# Patient Record
Sex: FEMALE | Race: WHITE | NOT HISPANIC OR LATINO | Employment: UNEMPLOYED | URBAN - METROPOLITAN AREA
[De-identification: names, ages, dates, MRNs, and addresses within clinical notes are randomized per-mention and may not be internally consistent; named-entity substitution may affect disease eponyms.]

---

## 2017-04-25 ENCOUNTER — ALLSCRIPTS OFFICE VISIT (OUTPATIENT)
Dept: OTHER | Facility: OTHER | Age: 47
End: 2017-04-25

## 2017-07-10 ENCOUNTER — DOCTOR'S OFFICE (OUTPATIENT)
Dept: URBAN - NONMETROPOLITAN AREA CLINIC 1 | Facility: CLINIC | Age: 47
Setting detail: OPHTHALMOLOGY
End: 2017-07-10
Payer: COMMERCIAL

## 2017-07-10 ENCOUNTER — RX ONLY (RX ONLY)
Age: 47
End: 2017-07-10

## 2017-07-10 DIAGNOSIS — H40.033: ICD-10-CM

## 2017-07-10 DIAGNOSIS — H05.012: ICD-10-CM

## 2017-07-10 PROCEDURE — 99203 OFFICE O/P NEW LOW 30 MIN: CPT | Performed by: OPHTHALMOLOGY

## 2017-07-10 ASSESSMENT — CONFRONTATIONAL VISUAL FIELD TEST (CVF)
OS_FINDINGS: FULL
OD_FINDINGS: FULL

## 2017-07-11 ASSESSMENT — REFRACTION_MANIFEST
OD_VA1: 20/
OD_VA3: 20/
OS_VA2: 20/
OD_VA2: 20/
OS_VA3: 20/
OD_VA3: 20/
OD_VA3: 20/
OU_VA: 20/
OD_VA1: 20/
OD_VA2: 20/
OS_VA1: 20/
OS_VA2: 20/
OU_VA: 20/
OU_VA: 20/
OS_VA3: 20/
OS_VA1: 20/
OS_VA1: 20/
OD_VA2: 20/
OS_VA2: 20/
OS_VA3: 20/
OD_VA1: 20/

## 2017-07-11 ASSESSMENT — VISUAL ACUITY
OS_BCVA: 20/20-2
OD_BCVA: 20/30-1

## 2017-07-11 ASSESSMENT — REFRACTION_CURRENTRX
OS_OVR_VA: 20/
OD_OVR_VA: 20/

## 2018-01-16 NOTE — MISCELLANEOUS
Provider Comments  Provider Comments:   Pt was a no-show for today's 1pm apt  Pt wcb to r/s at a later date        Signatures   Electronically signed by : Sneha Carlson, ; Apr 25 2017  3:48PM EST                       (Administrative)

## 2018-10-18 ENCOUNTER — NURSE TRIAGE (OUTPATIENT)
Dept: PHYSICAL THERAPY | Facility: OTHER | Age: 48
End: 2018-10-18

## 2018-10-18 NOTE — TELEPHONE ENCOUNTER
Background - Initial Assessment  Clinical complaint: right sided back pain, specifically in the lat area  Does have some issue with neck  Is on her computer a lot  Sometimes it radiates to front of chest  Under bra line  Date of onset: August 2018  Mechanism of injury: unk    Previous Treatment - Previous Treatment  Previous evaluation: chiropractor 1 weekly   Current provider: PCP  Diagnostics: none  Previous treatment: none    Protocols used: [facility] PROTOCOL    Pt transferred from ortho phone room for untreated thoracic pain  Pt started with LBP in March and started with chiropractic care which made her symptoms more manageable  States she hadn't been to the gym in a while and she thinks her body wasn't used to the chiropractics care  Switched chiropractic doctors in the end of July thinking a fresh set of eyes would be helpful as it was taking long to resolve  States her low back pain was starting to resolve and then the thoracic pain started end of July / beginning of August with the start of the new chiropractor  Reports she has had LBP on and off for 20 yrs as a result of falling off a step then  This pain came out of the blue and is totally unrelated to anything she has felt before  Reports she is an avid gym person 2-3 times per week for about 40 min  Has zero pain during the day but when she sits or lays down she has immediate pressure  States she to lie on her right side or sleep into the recliner  Uses heat and ice off and on  States the heat provided more relief than ice  Its when her back is up on something, like the bed or chair is when it's most painful  Pt reports she has some blood disorders and chronic fatigue  RN explained that Comprehensive Spine program is physical therapy based with the goal of getting the patient scheduled in 24 - 48 hrs    Further explained that we schedule patients for a consultation with one of our advanced spine physical therapists for evaluation which may include treatment  These therapists have their doctorate in PTx  If needed, a telemed visit could occur at the same time of this consultation if muscle relaxers or a higher dose NSAID is needed  The goal is to get patients treated as quickly as possible so they can return to their normal activities  Research has shown great results when starting physical therapy sooner than later which helps reduce imaging and costs to patients  Pt thought the program sounded great but admitted to doing PTx with a friend on the side stating PTx is very expensive and she has to pay $100/visit with her insurance  RN offered [facility] financial aid and further explained that based on this intake, her risk category was low and would likely only need a couple sessions        Pt stated "I think she is beyond physical therapy and would like to see a DO and get xrays "    Pt is declining PTx at this time and was transferred back to the Ortho phone room for appt with Ortho sports

## 2020-02-22 ENCOUNTER — HOSPITAL ENCOUNTER (EMERGENCY)
Facility: HOSPITAL | Age: 50
Discharge: HOME | End: 2020-02-22
Attending: EMERGENCY MEDICINE | Admitting: EMERGENCY MEDICINE
Payer: COMMERCIAL

## 2020-02-22 VITALS
TEMPERATURE: 98.5 F | DIASTOLIC BLOOD PRESSURE: 81 MMHG | OXYGEN SATURATION: 96 % | WEIGHT: 206.57 LBS | RESPIRATION RATE: 18 BRPM | SYSTOLIC BLOOD PRESSURE: 164 MMHG | HEART RATE: 90 BPM | BODY MASS INDEX: 34.38 KG/M2

## 2020-02-22 DIAGNOSIS — I10 HIGH BLOOD PRESSURE: Primary | ICD-10-CM

## 2020-02-22 PROCEDURE — 93005 ELECTROCARDIOGRAM TRACING: CPT

## 2020-02-22 PROCEDURE — 99284 EMERGENCY DEPT VISIT MOD MDM: CPT | Performed by: EMERGENCY MEDICINE

## 2020-02-22 PROCEDURE — 99283 EMERGENCY DEPT VISIT LOW MDM: CPT

## 2020-02-22 RX ORDER — UREA 10 %
20 LOTION (ML) TOPICAL DAILY
COMMUNITY

## 2020-02-22 RX ORDER — OXYCODONE HYDROCHLORIDE 5 MG/1
5 CAPSULE ORAL EVERY 4 HOURS PRN
COMMUNITY

## 2020-02-22 RX ORDER — PANTOPRAZOLE SODIUM 40 MG/1
40 TABLET, DELAYED RELEASE ORAL DAILY
COMMUNITY

## 2020-02-22 RX ORDER — OXYCODONE HCL 10 MG/1
10 TABLET, FILM COATED, EXTENDED RELEASE ORAL 2 TIMES DAILY
COMMUNITY
Start: 2020-02-20 | End: 2020-03-01

## 2020-02-22 RX ORDER — METOPROLOL SUCCINATE 100 MG/1
100 TABLET, EXTENDED RELEASE ORAL DAILY
COMMUNITY
Start: 2020-01-03

## 2020-02-22 RX ORDER — DEXAMETHASONE 4 MG/1
4 TABLET ORAL DAILY
COMMUNITY
Start: 2019-12-26

## 2020-02-22 RX ORDER — CLONIDINE HYDROCHLORIDE 0.1 MG/1
0.2 TABLET ORAL ONCE
Status: COMPLETED | OUTPATIENT
Start: 2020-02-22 | End: 2020-02-22

## 2020-02-22 RX ADMIN — CLONIDINE HYDROCHLORIDE 0.2 MG: 0.1 TABLET ORAL at 06:16

## 2020-02-22 NOTE — DISCHARGE INSTRUCTIONS
Make sure to follow up with your family doctor tomorrow  If symptoms persist or worsen, return to ER!

## 2020-02-22 NOTE — ED PROVIDER NOTES
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  Took blood pressure 1 hour ago and it was 180/100  Pt normally 115/70 and was concerned that BP abnormally elevated  Pt states she has a headache, denies chest pain  58-year-old female with extensive past medical history presents with high blood pressure  Hypertension   Severity:  Moderate  Onset quality:  Gradual  Duration:  1 day  Timing:  Constant  Progression:  Unchanged  Chronicity:  Recurrent  Context: stress    Relieved by:  Nothing  Worsened by:  Nothing  Ineffective treatments:  None tried  Associated symptoms: headaches    Associated symptoms: no chest pain        Prior to Admission Medications   Prescriptions Last Dose Informant Patient Reported? Taking?    Cetirizine HCl 10 MG CAPS 2/21/2020 at Unknown time  Yes Yes   Sig: Take 10 mg by mouth daily   Cholecalciferol 25 MCG (1000 UT) tablet Past Week at Unknown time  Yes Yes   Sig: Take 5,000 Units by mouth daily   Clopidogrel Bisulfate (PLAVIX PO) 2/22/2020 at Unknown time  Yes Yes   Sig: Take 75 mg by mouth daily   GABAPENTIN PO 2/21/2020 at Unknown time  Yes Yes   Sig: Take 600 mg by mouth 3 (three) times a day   Ivosidenib (Tibsovo) 250 MG TABS 2/17/2020 at Unknown time  Yes Yes   Sig: Take 500 mg by mouth daily   dexamethasone (DECADRON) 4 mg tablet 2/21/2020 at Unknown time  Yes Yes   Sig: Take 4 mg by mouth daily   melatonin 1 mg 2/21/2020 at Unknown time  Yes Yes   Sig: Take 20 mg by mouth daily   metoprolol succinate (TOPROL-XL) 100 mg 24 hr tablet 2/21/2020 at Unknown time  Yes Yes   Sig: Take 100 mg by mouth daily   oxyCODONE (OXY-IR) 5 MG capsule 2/17/2020 at Unknown time  Yes Yes   Sig: Take 5 mg by mouth every 4 (four) hours as needed   oxyCODONE (OxyCONTIN) 10 mg 12 hr tablet 2/21/2020 at Unknown time  Yes Yes   Sig: Take 10 mg by mouth 2 (two) times a day   pantoprazole (PROTONIX) 40 mg tablet 2/21/2020 at Unknown time  Yes Yes   Sig: Take 40 mg by mouth daily      Facility-Administered Medications: None       Past Medical History:   Diagnosis Date    Cancer [facility])     bile duct cancer    History of left hip replacement     Dec 2019    Hypertension     MI [facility]ocardial [facility]) [facility] [address] )     2013       Past Surgical History:   Procedure Laterality Date    CHOLECYSTECTOMY      OTHER SURGICAL HISTORY      radiation to L hip       History reviewed  No pertinent family history  I have reviewed and agree with the history as documented  Social History     Tobacco Use    Smoking status: Never Smoker    Smokeless tobacco: Never Used   Substance Use Topics    Alcohol use: Never     Frequency: Never    Drug use: Never       Review of Systems   Cardiovascular: Negative for chest pain  Neurological: Positive for headaches  All other systems reviewed and are negative  Physical Exam  Physical Exam   Constitutional: She is oriented to person, place, and time  She appears well-developed and well-nourished  HENT:   Head: Normocephalic  Eyes: Pupils are equal, round, and reactive to light  EOM are normal    Neck: Normal range of motion  Neck supple  Cardiovascular: Normal rate and regular rhythm  Pulmonary/Chest: Effort normal and breath sounds normal    Abdominal: Soft  Bowel sounds are normal  She exhibits no distension  Musculoskeletal: Normal range of motion  Neurological: She is alert and oriented to person, place, and time  Skin: Skin is warm and dry  Capillary refill takes less than 2 seconds  Psychiatric: She has a normal mood and affect  Her behavior is normal    Nursing note and vitals reviewed        Vital Signs  ED Triage Vitals   Temperature Pulse Respirations Blood Pressure SpO2   02/22/20 0603 02/22/20 0603 02/22/20 0603 02/22/20 0603 02/22/20 0603   98 5 °F (36 9 °C) 101 17 (!) 191/94 93 %      Temp Source Heart Rate Source Patient Position - Orthostatic VS BP Location FiO2 (%)   02/22/20 0603 02/22/20 0603 02/22/20 0603 02/22/20 0603 --   Temporal Monitor Sitting Left arm       Pain Score       02/22/20 0620       4           Vitals:    02/22/20 0603 02/22/20 0637   BP: (!) 191/94 164/81   Pulse: 101 90   Patient Position - Orthostatic VS: Sitting Sitting         Visual Acuity      ED Medications  Medications   cloNIDine (CATAPRES) tablet 0 2 mg (0 2 mg Oral Given 2/22/20 0393)       Diagnostic Studies  Results Reviewed     None                 No orders to display              Procedures  Procedures         ED Course  ED Course as of Feb 23 0627   Sat Feb 22, 2020   0646 Patient's pressure has improved, symptoms have resolved  Patient was counseled to follow-up with family doctor regarding blood pressure and pain management  Patient is counseled signs and symptoms return  8191 EKG:  Normal sinus rhythm and 80 beats per minute, normal SD, normal QRS, normal QTC, no acute ST elevations noted  MDM      Disposition  Final diagnoses:   High blood pressure     Time reflects when diagnosis was documented in both MDM as applicable and the Disposition within this note     Time User Action Codes Description Comment    2/22/2020  6:43 AM Cem Cho Add [I10] High blood pressure       ED Disposition     ED Disposition Condition Date/Time Comment    Discharge Stable Sat Feb 22, 2020  6:43 AM Prabhjot Bean discharge to home/self care              Follow-up Information     Follow up With Specialties Details Why Contact Info Additional Information    [facility] [address] Call in 1 day  [facility] [phone]  [address], [place], [place], [facility]          Discharge Medication List as of 2/22/2020  6:44 AM      CONTINUE these medications which have NOT CHANGED    Details   Cetirizine HCl 10 MG CAPS Take 10 mg by mouth daily, Historical Med      Cholecalciferol 25 MCG (1000 UT) tablet Take 5,000 Units by mouth daily, Historical Med      Clopidogrel Bisulfate (PLAVIX PO) Take 75 mg by mouth daily, Historical Med      dexamethasone (DECADRON) 4 mg tablet Take 4 mg by mouth daily, Starting Thu 12/26/2019, Historical Med      GABAPENTIN PO Take 600 mg by mouth 3 (three) times a day, Historical Med      Ivosidenib (Tibsovo) 250 MG TABS Take 500 mg by mouth daily, Starting Wed 9/4/2019, Historical Med      melatonin 1 mg Take 20 mg by mouth daily, Historical Med      metoprolol succinate (TOPROL-XL) 100 mg 24 hr tablet Take 100 mg by mouth daily, Starting Fri 1/3/2020, Historical Med      oxyCODONE (OXY-IR) 5 MG capsule Take 5 mg by mouth every 4 (four) hours as needed, Historical Med      oxyCODONE (OxyCONTIN) 10 mg 12 hr tablet Take 10 mg by mouth 2 (two) times a day, Starting Thu 2/20/2020, Until Sun 3/1/2020, Historical Med      pantoprazole (PROTONIX) 40 mg tablet Take 40 mg by mouth daily, Historical Med           No discharge procedures on file      PDMP Review     None          ED Provider  Electronically Signed by           Shanda Cullen, DO  02/22/20 Jasiel Jenkins [phone] [facility], DO  02/23/20 0170

## 2020-02-23 LAB
ATRIAL RATE: 92 BPM
P AXIS: 23 DEGREES
PR INTERVAL: 136 MS
QRS AXIS: 21 DEGREES
QRSD INTERVAL: 76 MS
QT INTERVAL: 354 MS
QTC INTERVAL: 437 MS
T WAVE AXIS: 37 DEGREES
VENTRICULAR RATE: 92 BPM

## 2020-02-23 PROCEDURE — 93010 ELECTROCARDIOGRAM REPORT: CPT | Performed by: INTERNAL MEDICINE
